# Patient Record
Sex: FEMALE | Race: WHITE | URBAN - METROPOLITAN AREA
[De-identification: names, ages, dates, MRNs, and addresses within clinical notes are randomized per-mention and may not be internally consistent; named-entity substitution may affect disease eponyms.]

---

## 2017-08-17 ENCOUNTER — HOSPITAL ENCOUNTER (OUTPATIENT)
Dept: PHYSICAL THERAPY | Age: 32
Discharge: HOME | End: 2017-08-17
Payer: COMMERCIAL

## 2017-08-17 PROCEDURE — 97110 THERAPEUTIC EXERCISES: CPT

## 2017-08-17 PROCEDURE — 97161 PT EVAL LOW COMPLEX 20 MIN: CPT

## 2017-08-17 NOTE — PROGRESS NOTES
PT DAILY TREATMENT NOTE     Patient Name: Myrna Malhotra  Date:2017  : 1985  [x]  Patient  Verified  Payor: Longtariq Rogershakeem / Plan: Argelia Quiros [id number] PPO / Product Type: PPO /    In time:800  Out time:838  Total Treatment Time (min): 38  Visit #: 1 of 8    Treatment Area: Neck pain [M54.2]  Neck sprain [S13. 9XXA]  Lumbar sprain [S33. 5XXA]    SUBJECTIVE  Pain Level (0-10 scale): 4-5  Any medication changes, allergies to medications, adverse drug reactions, diagnosis change, or new procedure performed?: [x] No    [] Yes (see summary sheet for update)  Subjective functional status/changes:   [] No changes reported      OBJECTIVE    Modality rationale:    Min Type Additional Details    [] Estim:  []Unatt       []IFC  []Premod                        []Other:  []w/ice   []w/heat  Position:  Location:    [] Estim: []Att    []TENS instruct  []NMES                    []Other:  []w/US   []w/ice   []w/heat  Position:  Location:    []  Traction: [] Cervical       []Lumbar                       [] Prone          []Supine                       []Intermittent   []Continuous Lbs:  [] before manual  [] after manual    []  Ultrasound: []Continuous   [] Pulsed                           []1MHz   []3MHz W/cm2:  Location:    []  Iontophoresis with dexamethasone         Location: [] Take home patch   [] In clinic    []  Ice     []  heat  []  Ice massage  []  Laser   []  Anodyne Position:  Location:    []  Laser with stim  []  Other:  Position:  Location:    []  Vasopneumatic Device Pressure:       [] lo [] med [] hi   Temperature: [] lo [] med [] hi   [] Skin assessment post-treatment:  []intact []redness- no adverse reaction    []redness  adverse reaction:     30 min [x]Eval                  []Re-Eval       8 min Therapeutic Exercise:  [] See flow sheet :HEP   Rationale: increase ROM and increase strength to improve the patients ability to perform daily tasks       With   [] TE   [] TA   [] neuro   [] other: Patient Education: [x] Review HEP    [] Progressed/Changed HEP based on:   [] positioning   [] body mechanics   [] transfers   [] heat/ice application    [] other:      Other Objective/Functional Measures:      Pain Level (0-10 scale) post treatment: 4-5    ASSESSMENT/Changes in Function:     Patient will continue to benefit from skilled PT services to modify and progress therapeutic interventions, address functional mobility deficits, address ROM deficits, address strength deficits, analyze and address soft tissue restrictions, analyze and cue movement patterns, analyze and modify body mechanics/ergonomics, assess and modify postural abnormalities, address imbalance/dizziness and instruct in home and community integration to attain remaining goals.      [x]  See Plan of Care  []  See progress note/recertification  []  See Discharge Summary         Progress towards goals / Updated goals:  See POC    PLAN  []  Upgrade activities as tolerated     [x]  Continue plan of care  []  Update interventions per flow sheet       []  Discharge due to:_  []  Other:_      David Hernandez PT 8/17/2017  7:34 AM    Future Appointments  Date Time Provider Pako Moya   8/17/2017 8:00 AM David Hernandez PT [facility]
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
## 2017-08-22 ENCOUNTER — HOSPITAL ENCOUNTER (OUTPATIENT)
Dept: PHYSICAL THERAPY | Age: 32
Discharge: HOME | End: 2017-08-22
Payer: COMMERCIAL

## 2017-08-22 PROCEDURE — 97112 NEUROMUSCULAR REEDUCATION: CPT

## 2017-08-22 PROCEDURE — 97140 MANUAL THERAPY 1/> REGIONS: CPT

## 2017-08-22 NOTE — PROGRESS NOTES
PT DAILY TREATMENT NOTE     Patient Name: Álvaro Alas  Date:2017  : 1985  [x]  Patient  Verified  Payor: BLUE CROSS / Plan: Argelia Quiros [id number] PPO / Product Type: PPO /    In time:727  Out time:810  Total Treatment Time (min): 43  Visit #: 2 of 8    Treatment Area: Neck pain [M54.2]  Neck sprain [S13. 9XXA]  Lumbar sprain [S33. 5XXA]    SUBJECTIVE  Pain Level (0-10 scale): 4  Any medication changes, allergies to medications, adverse drug reactions, diagnosis change, or new procedure performed?: [x] No    [] Yes (see summary sheet for update)  Subjective functional status/changes:   [] No changes reported  It's still the same tightness. I had to help a resident yesterday and that tweaked my back.      OBJECTIVE    Modality rationale: decrease pain and increase tissue extensibility to improve the patients ability to improve activity tolerance   Min Type Additional Details    [] Estim:  []Unatt       []IFC  []Premod                        []Other:  []w/ice   []w/heat  Position:  Location:    [] Estim: []Att    []TENS instruct  []NMES                    []Other:  []w/US   []w/ice   []w/heat  Position:  Location:    []  Traction: [] Cervical       []Lumbar                       [] Prone          []Supine                       []Intermittent   []Continuous Lbs:  [] before manual  [] after manual    []  Ultrasound: []Continuous   [] Pulsed                           []1MHz   []3MHz W/cm2:  Location:    []  Iontophoresis with dexamethasone         Location: [] Take home patch   [] In clinic   10 []  Ice     [x]  heat  []  Ice massage  []  Laser   []  Anodyne Position:seated  Location:neck    []  Laser with stim  []  Other:  Position:  Location:    []  Vasopneumatic Device Pressure:       [] lo [] med [] hi   Temperature: [] lo [] med [] hi   [] Skin assessment post-treatment:  []intact []redness- no adverse reaction    []redness  adverse reaction:     23 min Neuromuscular Re-education:  [x] See flow sheet :   Rationale: increase strength and improve coordination  to improve the patients ability to improve postural awareness and stability     10 min Manual Therapy:  STM bilateral rhomboids   Rationale: decrease pain, increase ROM, increase tissue extensibility and decrease trigger points to reduce headaches      With   [] TE   [] TA   [] neuro   [] other: Patient Education: [x] Review HEP    [] Progressed/Changed HEP based on:   [] positioning   [] body mechanics   [] transfers   [] heat/ice application    [] other:      Other Objective/Functional Measures: initiated treatment per flow sheet     Pain Level (0-10 scale) post treatment: 5    ASSESSMENT/Changes in Function: Patient demonstrates good technique with exercises but does report increased soreness afterward; reports that at home she has been avoiding aggravating factors so today her muscles were not as used to working out. States modalities helped. Patient will continue to benefit from skilled PT services to modify and progress therapeutic interventions, address functional mobility deficits, address ROM deficits, address strength deficits, analyze and address soft tissue restrictions, analyze and cue movement patterns, analyze and modify body mechanics/ergonomics, assess and modify postural abnormalities, address imbalance/dizziness and instruct in home and community integration to attain remaining goals. []  See Plan of Care  []  See progress note/recertification  []  See Discharge Summary         Progress towards goals / Updated goals:  Short Term Goals: To be accomplished in 1 weeks:  Goal: Patient will be independent and compliant with HEP in order to progress toward long term goals. Status at last note/certification: Issued and reviewed  Current status: Met  Long Term Goals: To be accomplished in 4 weeks:  Goal: Patient will improve FOTO assessment score to 72 in order to indicate improved functional abilities.   Status at last note/certification: 52  Goal: Patient will improve cervical AROM by at least 5 degrees all planes, without increase in pain, in order to improve ease of driving. Status at last note/certification: flexion 37, extension 35, rotation Right 60 Left 50, lateral flexion Right 25 Left 20 all with pain  Goal: Patient will report worst neck pain as 4/10 or less in order to improve ease of caring for 3year old daughter. Status at last note/certification: 6/75  Goal: Patient will report no more than 1 headache every 2 weeks in order to improve overall quality of life.   Status at last note/certification: 1-2 per week    PLAN  []  Upgrade activities as tolerated     [x]  Continue plan of care  []  Update interventions per flow sheet       []  Discharge due to:_  []  Other:_      Chantal Jara, PT 8/22/2017  7:10 AM    Future Appointments  Date Time Provider Pako Moya   8/22/2017 7:30 AM Chantal Jara, PT [facility]   8/25/2017 7:30 AM Giorgio Gibbons [facility]   8/29/2017 7:30 AM Chantal Jara, PT [facility]   9/1/2017 7:30 AM Chantal Jara, PT [facility]   9/6/2017 7:30 AM Chantal Jara, PT [facility]   9/8/2017 7:30 AM Chantal Jara, PT [facility]

## 2017-08-25 ENCOUNTER — HOSPITAL ENCOUNTER (OUTPATIENT)
Dept: PHYSICAL THERAPY | Age: 32
Discharge: HOME | End: 2017-08-25
Payer: COMMERCIAL

## 2017-08-25 PROCEDURE — 97140 MANUAL THERAPY 1/> REGIONS: CPT

## 2017-08-25 PROCEDURE — 97112 NEUROMUSCULAR REEDUCATION: CPT

## 2017-08-25 NOTE — PROGRESS NOTES
PT DAILY TREATMENT NOTE     Patient Name: Tom Mai  Date:2017  : 1985  [x]  Patient  Verified  Payor: BLUE CROSS / Plan: Argelia Quiros [id number] PPO / Product Type: PPO /    In time:1158 Out time:1235  Total Treatment Time (min): 37  Visit #: 3 of 8    Treatment Area: Neck pain [M54.2]  Neck sprain [S13. 9XXA]  Lumbar sprain [S33. 5XXA]    SUBJECTIVE  Pain Level (0-10 scale): 4  Any medication changes, allergies to medications, adverse drug reactions, diagnosis change, or new procedure performed?: [x] No    [] Yes (see summary sheet for update)  Subjective functional status/changes:   [] No changes reported  I was very stiff and sore after last time.      OBJECTIVE    Modality rationale: Patient declined   Min Type Additional Details    [] Estim:  []Unatt       []IFC  []Premod                        []Other:  []w/ice   []w/heat  Position:  Location:    [] Estim: []Att    []TENS instruct  []NMES                    []Other:  []w/US   []w/ice   []w/heat  Position:  Location:    []  Traction: [] Cervical       []Lumbar                       [] Prone          []Supine                       []Intermittent   []Continuous Lbs:  [] before manual  [] after manual    []  Ultrasound: []Continuous   [] Pulsed                           []1MHz   []3MHz W/cm2:  Location:    []  Iontophoresis with dexamethasone         Location: [] Take home patch   [] In clinic    []  Ice     []  heat  []  Ice massage  []  Laser   []  Anodyne Position:  Location:    []  Laser with stim  []  Other:  Position:  Location:    []  Vasopneumatic Device Pressure:       [] lo [] med [] hi   Temperature: [] lo [] med [] hi   [] Skin assessment post-treatment:  []intact []redness- no adverse reaction    []redness  adverse reaction:     27 min Neuromuscular Re-education:  [x]  See flow sheet :   Rationale: increase strength and improve coordination  to improve the patients ability to improve postural awareness, upper body stability     10 min Manual Therapy:  STM Bilateral Rhomboids in prone   Rationale: decrease pain, increase ROM, increase tissue extensibility and decrease trigger points to improve ease of job tasks      With   [] TE   [] TA   [] neuro   [] other: Patient Education: [x] Review HEP    [] Progressed/Changed HEP based on:   [] positioning   [] body mechanics   [] transfers   [] heat/ice application    [] other:      Other Objective/Functional Measures: added Chair HS I, chin tuck and lift     Pain Level (0-10 scale) post treatment: 5    ASSESSMENT/Changes in Function: Patient reports feeling more sore and stiff after last session, but as she predicted with beginning to work her muscles again. Did feel comfortable going through routine again today, adding two additional exercises. Slight improvement in some cervical AROM noted. Patient again reports feeling \"worked\" after session but not necessarily in a sharp painful way. Patient will continue to benefit from skilled PT services to modify and progress therapeutic interventions, address functional mobility deficits, address ROM deficits, address strength deficits, analyze and address soft tissue restrictions, analyze and cue movement patterns, analyze and modify body mechanics/ergonomics, assess and modify postural abnormalities and instruct in home and community integration to attain remaining goals. []  See Plan of Care  []  See progress note/recertification  []  See Discharge Summary         Progress towards goals / Updated goals:  Short Term Goals: To be accomplished in 1 weeks:  Goal: Patient will be independent and compliant with HEP in order to progress toward long term goals. Status at last note/certification: Issued and reviewed  Current status: Met  Long Term Goals: To be accomplished in 4 weeks:  Goal: Patient will improve FOTO assessment score to 72 in order to indicate improved functional abilities.   Status at last note/certification: 52  Current status: Assess next visit  Goal: Patient will improve cervical AROM by at least 5 degrees all planes, without increase in pain, in order to improve ease of driving. Status at last note/certification: flexion 37, extension 35, rotation Right 60 Left 50, lateral flexion Right 25 Left 20 all with pain  Current status: Progressing, flexion 55, extension 35, Rotation Right 50 Left 55, lateral flexion Right 27 Left 25  Goal: Patient will report worst neck pain as 4/10 or less in order to improve ease of caring for 3year old daughter. Status at last note/certification: 7/98  Current status: Progressing, 5-6/10  Goal: Patient will report no more than 1 headache every 2 weeks in order to improve overall quality of life.   Status at last note/certification: 1-2 per week  Current status: Progressing, headache earlier this week    PLAN  [x]  Upgrade activities as tolerated     [x]  Continue plan of care  []  Update interventions per flow sheet       []  Discharge due to:_  []  Other:_      Berto Lewis PT 8/25/2017  11:13 AM    Future Appointments  Date Time Provider Pako Moya   8/25/2017 12:00 PM Berto Lewis PT [facility]   8/29/2017 7:30 AM Berto Lewis PT [facility]   9/1/2017 7:30 AM Berto Lewis PT [facility]   9/6/2017 7:30 AM Berto Lewis PT [facility]   9/8/2017 7:30 AM Berto Lewis, JACKIE Roque

## 2017-08-29 ENCOUNTER — HOSPITAL ENCOUNTER (OUTPATIENT)
Dept: PHYSICAL THERAPY | Age: 32
Discharge: HOME | End: 2017-08-29
Payer: COMMERCIAL

## 2017-08-29 PROCEDURE — 97112 NEUROMUSCULAR REEDUCATION: CPT

## 2017-08-29 PROCEDURE — 97140 MANUAL THERAPY 1/> REGIONS: CPT

## 2017-08-29 NOTE — PROGRESS NOTES
PT DAILY TREATMENT NOTE     Patient Name: Carol Home  Date:2017  : 1985  [x]  Patient  Verified  Payor: BLUE CROSS / Plan: Argelia Quiros [id number] PPO / Product Type: PPO /    In time:743  Out time:820  Total Treatment Time (min): 37  Visit #: 4 of 8    Treatment Area: Neck pain [M54.2]  Neck sprain [S13. 9XXA]  Lumbar sprain [S33. 5XXA]    SUBJECTIVE  Pain Level (0-10 scale): 0  Any medication changes, allergies to medications, adverse drug reactions, diagnosis change, or new procedure performed?: [x] No    [] Yes (see summary sheet for update)  Subjective functional status/changes:   [] No changes reported  Before I went to bed I did some stretching and laid on the heating pad. So it felt good this morning.      OBJECTIVE    Modality rationale: Patient declined   Min Type Additional Details    [] Estim:  []Unatt       []IFC  []Premod                        []Other:  []w/ice   []w/heat  Position:  Location:    [] Estim: []Att    []TENS instruct  []NMES                    []Other:  []w/US   []w/ice   []w/heat  Position:  Location:    []  Traction: [] Cervical       []Lumbar                       [] Prone          []Supine                       []Intermittent   []Continuous Lbs:  [] before manual  [] after manual    []  Ultrasound: []Continuous   [] Pulsed                           []1MHz   []3MHz W/cm2:  Location:    []  Iontophoresis with dexamethasone         Location: [] Take home patch   [] In clinic    []  Ice     []  heat  []  Ice massage  []  Laser   []  Anodyne Position:  Location:    []  Laser with stim  []  Other:  Position:  Location:    []  Vasopneumatic Device Pressure:       [] lo [] med [] hi   Temperature: [] lo [] med [] hi   [] Skin assessment post-treatment:  []intact []redness- no adverse reaction    []redness  adverse reaction:     27 min Neuromuscular Re-education:  [x]  See flow sheet :   Rationale: increase strength and improve coordination  to improve the patients ability to improve neck/shoulder stability with daily tasks    10 min Manual Therapy:  STM b rhomboids   Rationale: decrease pain, increase ROM, increase tissue extensibility and decrease trigger points to improve ease of caring for young daughter          With   [] TE   [] TA   [] neuro   [] other: Patient Education: [x] Review HEP    [] Progressed/Changed HEP based on:   [] positioning   [] body mechanics   [] transfers   [] heat/ice application    [] other:      Other Objective/Functional Measures: increased repetitions     Pain Level (0-10 scale) post treatment: 2    ASSESSMENT/Changes in Function: Patient reports faster recovery after last session regarding stiffness/soreness. Able to progress routine today. Reports only discomfort is between her shoulder blades after session. Patient will continue to benefit from skilled PT services to modify and progress therapeutic interventions, address functional mobility deficits, address ROM deficits, address strength deficits, analyze and address soft tissue restrictions, analyze and cue movement patterns, analyze and modify body mechanics/ergonomics, assess and modify postural abnormalities and instruct in home and community integration to attain remaining goals. []  See Plan of Care  []  See progress note/recertification  []  See Discharge Summary         Progress towards goals / Updated goals:  Short Term Goals: To be accomplished in 1 weeks:  Goal: Patient will be independent and compliant with HEP in order to progress toward long term goals. Status at last note/certification: Issued and reviewed  Current status: Met  Long Term Goals: To be accomplished in 4 weeks:  Goal: Patient will improve FOTO assessment score to 72 in order to indicate improved functional abilities.   Status at last note/certification: 52  Current status: Assess next visit  Goal: Patient will improve cervical AROM by at least 5 degrees all planes, without increase in pain, in order to improve ease of driving. Status at last note/certification: flexion 37, extension 35, rotation Right 60 Left 50, lateral flexion Right 25 Left 20 all with pain  Current status: Progressing, flexion 55, extension 35, Rotation Right 50 Left 55, lateral flexion Right 27 Left 25  Goal: Patient will report worst neck pain as 4/10 or less in order to improve ease of caring for 3year old daughter. Status at last note/certification: 1/60  Current status: Progressing, 5-6/10  Goal: Patient will report no more than 1 headache every 2 weeks in order to improve overall quality of life.   Status at last note/certification: 1-2 per week  Current status: Progressing, headache earlier this week    PLAN  [x]  Upgrade activities as tolerated     [x]  Continue plan of care  []  Update interventions per flow sheet       []  Discharge due to:_  []  Other:_      Miguel Lyon PT 8/29/2017  7:13 AM    Future Appointments  Date Time Provider Pako Moya   8/29/2017 7:30 AM Miguel Lyon [facility]   9/1/2017 7:30 AM Miguel Lyon PT [facility]   9/6/2017 7:30 AM Miguel Lyon PT [facility]   9/8/2017 7:30 AM Miguel Lyon, PT Sherryle Connor

## 2017-09-01 ENCOUNTER — HOSPITAL ENCOUNTER (OUTPATIENT)
Dept: PHYSICAL THERAPY | Age: 32
Discharge: HOME | End: 2017-09-01
Payer: COMMERCIAL

## 2017-09-01 PROCEDURE — 97140 MANUAL THERAPY 1/> REGIONS: CPT

## 2017-09-01 PROCEDURE — 97112 NEUROMUSCULAR REEDUCATION: CPT

## 2017-09-01 NOTE — PROGRESS NOTES
PT DAILY TREATMENT NOTE     Patient Name: Tyrese Mcpherson  Date:2017  : 1985  [x]  Patient  Verified  Payor: BLUE CROSS / Plan: Argelia Quiros [id number] PPO / Product Type: PPO /    In time:730  Out time:802  Total Treatment Time (min): 32  Visit #: 5 of 8    Treatment Area: Neck pain [M54.2]  Neck sprain [S13. 9XXA]  Lumbar sprain [S33. 5XXA]    SUBJECTIVE  Pain Level (0-10 scale): 3.5  Any medication changes, allergies to medications, adverse drug reactions, diagnosis change, or new procedure performed?: [x] No    [] Yes (see summary sheet for update)  Subjective functional status/changes:   [] No changes reported  I think I did too much.     OBJECTIVE    Modality rationale: Patient declined   Min Type Additional Details    [] Estim:  []Unatt       []IFC  []Premod                        []Other:  []w/ice   []w/heat  Position:  Location:    [] Estim: []Att    []TENS instruct  []NMES                    []Other:  []w/US   []w/ice   []w/heat  Position:  Location:    []  Traction: [] Cervical       []Lumbar                       [] Prone          []Supine                       []Intermittent   []Continuous Lbs:  [] before manual  [] after manual    []  Ultrasound: []Continuous   [] Pulsed                           []1MHz   []3MHz W/cm2:  Location:    []  Iontophoresis with dexamethasone         Location: [] Take home patch   [] In clinic    []  Ice     []  heat  []  Ice massage  []  Laser   []  Anodyne Position:  Location:    []  Laser with stim  []  Other:  Position:  Location:    []  Vasopneumatic Device Pressure:       [] lo [] med [] hi   Temperature: [] lo [] med [] hi   [] Skin assessment post-treatment:  []intact []redness- no adverse reaction    []redness  adverse reaction:     22 min Neuromuscular Re-education:  [x]  See flow sheet :   Rationale: increase strength, improve coordination and increase proprioception  to improve the patients ability to improve upper body stability to reduce neck tension    10 min Manual Therapy:  STM B cervical paraspinals/UT/scalenes   Rationale: decrease pain, increase ROM, increase tissue extensibility and decrease trigger points to improve ease of job tasks, reduce headaches      With   [] TE   [] TA   [] neuro   [] other: Patient Education: [x] Review HEP    [] Progressed/Changed HEP based on:   [] positioning   [] body mechanics   [] transfers   [] heat/ice application    [] other:      Other Objective/Functional Measures: completed exercises per flow sheet     Pain Level (0-10 scale) post treatment: 4    ASSESSMENT/Changes in Function: Focused manual therapy on neck today due to lingering ROM deficits and complaints of neck pain when lifting her daughter. Patient will continue to benefit from skilled PT services to modify and progress therapeutic interventions, address functional mobility deficits, address ROM deficits, address strength deficits, analyze and address soft tissue restrictions, analyze and cue movement patterns, analyze and modify body mechanics/ergonomics, assess and modify postural abnormalities and instruct in home and community integration to attain remaining goals. []  See Plan of Care  []  See progress note/recertification  []  See Discharge Summary         Progress towards goals / Updated goals:  Short Term Goals: To be accomplished in 1 weeks:  Goal: Patient will be independent and compliant with HEP in order to progress toward long term goals. Status at last note/certification: Issued and reviewed  Current status: Met  Long Term Goals: To be accomplished in 4 weeks:  Goal: Patient will improve FOTO assessment score to 72 in order to indicate improved functional abilities. Status at last note/certification: 52  Current status: Progressing, 59  Goal: Patient will improve cervical AROM by at least 5 degrees all planes, without increase in pain, in order to improve ease of driving.   Status at last note/certification: flexion 37, extension 35, rotation Right 60 Left 50, lateral flexion Right 25 Left 20 all with pain  Current status: Progressing, flexion 55, extension 35, Rotation Right 45 Left 55, lateral flexion Right 21 Left 25  Goal: Patient will report worst neck pain as 4/10 or less in order to improve ease of caring for 3year old daughter. Status at last note/certification: 8/20  Current status: Progressing, 6/10  Goal: Patient will report no more than 1 headache every 2 weeks in order to improve overall quality of life.   Status at last note/certification: 1-2 per week  Current status: Met    PLAN  [x]  Upgrade activities as tolerated     [x]  Continue plan of care  []  Update interventions per flow sheet       []  Discharge due to:_  []  Other:_      Niraj Cabrales, PT 9/1/2017  7:13 AM    Future Appointments  Date Time Provider Pako Moya   9/1/2017 7:30 AM Niraj Cabrales, PT [facility]   9/6/2017 7:30 AM Niraj Cabrales, PT [facility]   9/8/2017 7:30 AM Niraj Cabrales, PT Kika Tyson

## 2017-09-06 ENCOUNTER — HOSPITAL ENCOUNTER (OUTPATIENT)
Dept: PHYSICAL THERAPY | Age: 32
Discharge: HOME | End: 2017-09-06
Payer: COMMERCIAL

## 2017-09-06 PROCEDURE — 97112 NEUROMUSCULAR REEDUCATION: CPT

## 2017-09-06 PROCEDURE — 97140 MANUAL THERAPY 1/> REGIONS: CPT

## 2017-09-06 NOTE — PROGRESS NOTES
PT DAILY TREATMENT NOTE     Patient Name: Kati Rodrigez  Date:2017  : 1985  [x]  Patient  Verified  Payor: BLUE CROSS / Plan: Argelia Quiros [id number] PPO / Product Type: PPO /    In time:740  Out time:812  Total Treatment Time (min): 32  Visit #: 6 of 8    Treatment Area: Neck pain [M54.2]  Neck sprain [S13. 9XXA]  Lumbar sprain [S33. 5XXA]    SUBJECTIVE  Pain Level (0-10 scale): 4  Any medication changes, allergies to medications, adverse drug reactions, diagnosis change, or new procedure performed?: [x] No    [] Yes (see summary sheet for update)  Subjective functional status/changes:   [] No changes reported  On  we were out and about and it got so bad in my neck I had to stop.      OBJECTIVE    Modality rationale:    Min Type Additional Details    [] Estim:  []Unatt       []IFC  []Premod                        []Other:  []w/ice   []w/heat  Position:  Location:    [] Estim: []Att    []TENS instruct  []NMES                    []Other:  []w/US   []w/ice   []w/heat  Position:  Location:    []  Traction: [] Cervical       []Lumbar                       [] Prone          []Supine                       []Intermittent   []Continuous Lbs:  [] before manual  [] after manual    []  Ultrasound: []Continuous   [] Pulsed                           []1MHz   []3MHz W/cm2:  Location:    []  Iontophoresis with dexamethasone         Location: [] Take home patch   [] In clinic    []  Ice     []  heat  []  Ice massage  []  Laser   []  Anodyne Position:  Location:    []  Laser with stim  []  Other:  Position:  Location:    []  Vasopneumatic Device Pressure:       [] lo [] med [] hi   Temperature: [] lo [] med [] hi   [] Skin assessment post-treatment:  []intact []redness- no adverse reaction    []redness  adverse reaction:     22 min Neuromuscular Re-education:  [x]  See flow sheet :   Rationale: increase strength, improve coordination and increase proprioception  to improve the patients ability to improve cervical and scapular stability during daily tasks    10 min Manual Therapy:  STM bilateral cervical paraspinals, UT/scalenes, gentle SOR/cervical manual distraction   Rationale: decrease pain, increase ROM, increase tissue extensibility and decrease trigger points to reduce headaches, improve ease of lifting daughter          With   [] TE   [] TA   [] neuro   [] other: Patient Education: [x] Review HEP    [] Progressed/Changed HEP based on:   [] positioning   [] body mechanics   [] transfers   [] heat/ice application    [] other:      Other Objective/Functional Measures: increased resistance per flow sheet     Pain Level (0-10 scale) post treatment: 2-3    ASSESSMENT/Changes in Function: Patient reports insidious increase in neck pain this weekend when out with her family. MD appointment yesterday went well with MD suggesting a few more weeks of therapy to continue to improve cervical AROM, especially Rotation Right. Patient will continue to benefit from skilled PT services to modify and progress therapeutic interventions, address functional mobility deficits, address ROM deficits, address strength deficits, analyze and address soft tissue restrictions, analyze and cue movement patterns, analyze and modify body mechanics/ergonomics, assess and modify postural abnormalities and instruct in home and community integration to attain remaining goals. []  See Plan of Care  []  See progress note/recertification  []  See Discharge Summary         Progress towards goals / Updated goals:  Short Term Goals: To be accomplished in 1 weeks:  Goal: Patient will be independent and compliant with HEP in order to progress toward long term goals. Status at last note/certification: Issued and reviewed  Current status: Met  Long Term Goals: To be accomplished in 4 weeks:  Goal: Patient will improve FOTO assessment score to 72 in order to indicate improved functional abilities.   Status at last note/certification: 52  Current status: Progressing, 59  Goal: Patient will improve cervical AROM by at least 5 degrees all planes, without increase in pain, in order to improve ease of driving. Status at last note/certification: flexion 37, extension 35, rotation Right 60 Left 50, lateral flexion Right 25 Left 20 all with pain  Current status: Progressing, flexion 55, extension 35, Rotation Right 45 Left 55, lateral flexion Right 21 Left 25  Goal: Patient will report worst neck pain as 4/10 or less in order to improve ease of caring for 3year old daughter. Status at last note/certification: 1/23  Current status: Progressing, 6/10  Goal: Patient will report no more than 1 headache every 2 weeks in order to improve overall quality of life.   Status at last note/certification: 1-2 per week  Current status: Met    PLAN  [x]  Upgrade activities as tolerated     [x]  Continue plan of care  []  Update interventions per flow sheet       []  Discharge due to:_  []  Other:_      Baudilio Keys PT 9/6/2017  7:14 AM    Future Appointments  Date Time Provider Pako Moya   9/6/2017 7:30 AM Baudilio Keys PT [facility] [address]   9/8/2017 7:30 AM Baudilio Keys PT [facility] [address]

## 2017-09-08 ENCOUNTER — HOSPITAL ENCOUNTER (OUTPATIENT)
Dept: PHYSICAL THERAPY | Age: 32
Discharge: HOME | End: 2017-09-08
Payer: COMMERCIAL

## 2017-09-08 PROCEDURE — 97140 MANUAL THERAPY 1/> REGIONS: CPT

## 2017-09-08 PROCEDURE — 97112 NEUROMUSCULAR REEDUCATION: CPT

## 2017-09-08 NOTE — PROGRESS NOTES
[facility], [address] [place] [address]  [phone] fax    Progress Note  Patient name: Surjit Richey Start of Care: 2017   Referral source: Krystle Parrish MD : 1985                          Medical Diagnosis: Neck pain [M54.2]  Neck sprain [S13. 9XXA]  Lumbar sprain [S33. 5XXA] Onset Date:17                          Treatment Diagnosis: neck, back pain   Prior Hospitalization: see medical history Provider#: [id number]   Medications: Verified on Patient summary List    Comorbidities: none   Prior Level of Function: Functionally independent, lives with family, works in property management    Visits from [facility] of Care: 7    Missed Visits: 0    Established Goals:          Excellent Good         Limited           None  [x] Increased ROM   []  [x]  []  []  [] Increased Strength  []  []  []  []  [x] Increased Mobility  []  [x]  []  []   [x] Decreased Pain   []  [x]  []  []  [] Decreased Swelling  []  []  []  []  Short Term Goals: To be accomplished in 1 weeks:  Goal: Patient will be independent and compliant with HEP in order to progress toward long term goals. Status at last note/certification: Issued and reviewed  Current status: Met  Long Term Goals: To be accomplished in 4 weeks:  Goal: Patient will improve FOTO assessment score to 72 in order to indicate improved functional abilities. Status at last note/certification: 52  Current status: Progressing, 59  Goal: Patient will improve cervical AROM by at least 5 degrees all planes, without increase in pain, in order to improve ease of driving.   Status at last note/certification: flexion 37, extension 35, rotation Right 60 Left 50, lateral flexion Right 25 Left 20 all with pain  Current status: Progressing, flexion 55, extension 45, Rotation Right 55 Left 55, lateral flexion Right 21 Left 25  Goal: Patient will report worst neck pain as 4/10 or less in order to improve ease of caring for 3year old daughter. Status at last note/certification: 2/46  Current status: Progressing, remains 6/10  Goal: Patient will report no more than 1 headache every 2 weeks in order to improve overall quality of life. Status at last note/certification: 1-2 per week  Current status: Met      Key Functional Changes:   Functional Gains: cervical AROM in most planes, fewer headaches, less frequent severe pain  Functional Deficits: limitations in cervical Right rotation and lateral flexion, some periods of elevated pain with too much activity/lifting daughter  % improvement: 40-50%  Pain   Average: 2-3/10       Best: 0-1/10     Worst: 6/10  Patient Goal: \"Back to what it was before the accident\"    Updated Goals: to be achieved in 4 weeks:  Goal: Patient will improve FOTO assessment score to 72 in order to indicate improved functional abilities. Status at last note/certification: 59  Goal: Patient will improve cervical Right rotation and lateral flexion AROM by at least 10 degrees, without increase in pain, in order to improve ease of driving. Status at last note/certification: Rotation 55, lateral flexion 21  Goal: Patient will report worst neck pain as 4/10 or less in order to improve ease of caring for 3year old daughter.   Status at last note/certification: 1/32    ASSESSMENT/RECOMMENDATIONS:  [x]Continue therapy per initial plan/protocol at a frequency of  2 x per week for 4 weeks  []Continue therapy with the following recommended changes:_____________________      _____________________________________________________________________  []Discontinue therapy progressing towards or have reached established goals  []Discontinue therapy due to lack of appreciable progress towards goals  []Discontinue therapy due to lack of attendance or compliance  []Await Physician's recommendations/decisions regarding therapy  []Other:________________________________________________________________    Thank you for this referral.   Mary Smith, PT 9/8/2017 7:15 AM  NOTE TO PHYSICIAN:  PLEASE COMPLETE THE ORDERS BELOW AND   FAX TO [facility]: [phone]  If you are unable to process this request in 24 hours please contact our office: [phone]  []  I have read the above report and request that my patient continue as recommended. []  I have read the above report and request that my patient continue therapy with the following changes/special instructions:________________________________________  []I have read the above report and request that my patient be discharged from therapy.     Physicians signature: ______________________________Date: ______Time:______

## 2017-09-08 NOTE — PROGRESS NOTES
PT DAILY TREATMENT NOTE     Patient Name: Timmy Ray  Date:2017  : 1985  [x]  Patient  Verified  Payor: BLUE CROSS / Plan: Argelia Quiros [id number] PPO / Product Type: PPO /    In time:735  Out time:805  Total Treatment Time (min): 30  Visit #: 7 of 8    Treatment Area: Neck pain [M54.2]  Neck sprain [S13. 9XXA]  Lumbar sprain [S33. 5XXA]    SUBJECTIVE  Pain Level (0-10 scale): 1-2  Any medication changes, allergies to medications, adverse drug reactions, diagnosis change, or new procedure performed?: [x] No    [] Yes (see summary sheet for update)  Subjective functional status/changes:   [] No changes reported  It's the same.     OBJECTIVE    Modality rationale:    Min Type Additional Details    [] Estim:  []Unatt       []IFC  []Premod                        []Other:  []w/ice   []w/heat  Position:  Location:    [] Estim: []Att    []TENS instruct  []NMES                    []Other:  []w/US   []w/ice   []w/heat  Position:  Location:    []  Traction: [] Cervical       []Lumbar                       [] Prone          []Supine                       []Intermittent   []Continuous Lbs:  [] before manual  [] after manual    []  Ultrasound: []Continuous   [] Pulsed                           []1MHz   []3MHz W/cm2:  Location:    []  Iontophoresis with dexamethasone         Location: [] Take home patch   [] In clinic    []  Ice     []  heat  []  Ice massage  []  Laser   []  Anodyne Position:  Location:    []  Laser with stim  []  Other:  Position:  Location:    []  Vasopneumatic Device Pressure:       [] lo [] med [] hi   Temperature: [] lo [] med [] hi   [] Skin assessment post-treatment:  []intact []redness- no adverse reaction    []redness  adverse reaction:     20 min Neuromuscular Re-education:  [x]  See flow sheet :   Rationale: increase strength and improve coordination  to improve the patients ability to improve ease of job tasks    10 min Manual Therapy:  STM B cervical paraspinals, scalenes, UT, manual cervical stretching   Rationale: decrease pain, increase ROM, increase tissue extensibility and decrease trigger points to reduce headaches    With   [] TE   [] TA   [] neuro   [] other: Patient Education: [x] Review HEP    [] Progressed/Changed HEP based on:   [] positioning   [] body mechanics   [] transfers   [] heat/ice application    [] other:      Other Objective/Functional Measures: see goals     Pain Level (0-10 scale) post treatment: 1-2    ASSESSMENT/Changes in Function: Progressing well with decreased pain. Some improvements in Right cervical rotation AROM compared to last week but overall less than evaluation. Will progress routine as able. Patient will continue to benefit from skilled PT services to modify and progress therapeutic interventions, address functional mobility deficits, address ROM deficits, address strength deficits, analyze and address soft tissue restrictions, analyze and cue movement patterns, analyze and modify body mechanics/ergonomics, assess and modify postural abnormalities and instruct in home and community integration to attain remaining goals. []  See Plan of Care  [x]  See progress note/recertification  []  See Discharge Summary         Progress towards goals / Updated goals:  Short Term Goals: To be accomplished in 1 weeks:  Goal: Patient will be independent and compliant with HEP in order to progress toward long term goals. Status at last note/certification: Issued and reviewed  Current status: Met  Long Term Goals: To be accomplished in 4 weeks:  Goal: Patient will improve FOTO assessment score to 72 in order to indicate improved functional abilities. Status at last note/certification: 52  Current status: Progressing, 59  Goal: Patient will improve cervical AROM by at least 5 degrees all planes, without increase in pain, in order to improve ease of driving.   Status at last note/certification: flexion 37, extension 35, rotation Right 60 Left 50, lateral flexion Right 25 Left 20 all with pain  Current status: Progressing, flexion 55, extension 45, Rotation Right 55 Left 55, lateral flexion Right 21 Left 25  Goal: Patient will report worst neck pain as 4/10 or less in order to improve ease of caring for 3year old daughter. Status at last note/certification: 5/96  Current status: Progressing, remains 6/10  Goal: Patient will report no more than 1 headache every 2 weeks in order to improve overall quality of life.   Status at last note/certification: 1-2 per week  Current status: Met       PLAN  [x]  Upgrade activities as tolerated     [x]  Continue plan of care  []  Update interventions per flow sheet       []  Discharge due to:_  []  Other:_      Lissa Cedeño, PT 9/8/2017  7:14 AM    Future Appointments  Date Time Provider Pako Moya   9/8/2017 7:30 AM Lissa Cedeño, PT [facility]   9/11/2017 7:30 AM Lissa Cedeño, PT [facility]   9/13/2017 7:30 AM Lissa Cedeño, PT [facility]   9/18/2017 7:30 AM Lissa Cedeño, PT [facility]   9/20/2017 7:30 AM Lissa Cedeño, PT [facility]   9/25/2017 7:30 AM Lissa Cedeño, PT [facility]   9/27/2017 7:30 AM Lissa Cedeño, PT Em[facility] Sauceda

## 2017-09-11 ENCOUNTER — HOSPITAL ENCOUNTER (OUTPATIENT)
Dept: PHYSICAL THERAPY | Age: 32
Discharge: HOME | End: 2017-09-11
Payer: COMMERCIAL

## 2017-09-11 PROCEDURE — 97112 NEUROMUSCULAR REEDUCATION: CPT

## 2017-09-11 PROCEDURE — 97140 MANUAL THERAPY 1/> REGIONS: CPT

## 2017-09-11 NOTE — PROGRESS NOTES
PT DAILY TREATMENT NOTE     Patient Name: Álvaro Alas  Date:2017  : 1985  [x]  Patient  Verified  Payor: BLUE CROSS / Plan: Argelia Quiros [id number] PPO / Product Type: PPO /    In time:739  Out time:805  Total Treatment Time (min): 26  Visit #: 1 of 8    Treatment Area: Neck pain [M54.2]  Neck sprain [S13. 9XXA]  Lumbar sprain [S33. 5XXA]    SUBJECTIVE  Pain Level (0-10 scale): 3-4  Any medication changes, allergies to medications, adverse drug reactions, diagnosis change, or new procedure performed?: [x] No    [] Yes (see summary sheet for update)  Subjective functional status/changes:   [] No changes reported  We were outside a lot this weekend so I feel it a little more.      OBJECTIVE    Modality rationale: Patient declined   Min Type Additional Details    [] Estim:  []Unatt       []IFC  []Premod                        []Other:  []w/ice   []w/heat  Position:  Location:    [] Estim: []Att    []TENS instruct  []NMES                    []Other:  []w/US   []w/ice   []w/heat  Position:  Location:    []  Traction: [] Cervical       []Lumbar                       [] Prone          []Supine                       []Intermittent   []Continuous Lbs:  [] before manual  [] after manual    []  Ultrasound: []Continuous   [] Pulsed                           []1MHz   []3MHz W/cm2:  Location:    []  Iontophoresis with dexamethasone         Location: [] Take home patch   [] In clinic    []  Ice     []  heat  []  Ice massage  []  Laser   []  Anodyne Position:  Location:    []  Laser with stim  []  Other:  Position:  Location:    []  Vasopneumatic Device Pressure:       [] lo [] med [] hi   Temperature: [] lo [] med [] hi   [] Skin assessment post-treatment:  []intact []redness- no adverse reaction    []redness  adverse reaction:     16 min Neuromuscular Re-education:  [x]  See flow sheet :   Rationale: increase strength and improve coordination  to improve the patients ability to improve scapular, cervical stability    10 min Manual Therapy:  Prone STM bilateral rhomboids/UT/scalenes/cervical paraspinals   Rationale: decrease pain, increase ROM, increase tissue extensibility and decrease trigger points to improve ease of lifting daughter    With   [] TE   [] TA   [] neuro   [] other: Patient Education: [x] Review HEP    [] Progressed/Changed HEP based on:   [] positioning   [] body mechanics   [] transfers   [] heat/ice application    [] other:      Other Objective/Functional Measures: adjusted routine     Pain Level (0-10 scale) post treatment: 3-4    ASSESSMENT/Changes in Function: Progressing scapular stability activities this morning. Patient will continue to benefit from skilled PT services to modify and progress therapeutic interventions, address functional mobility deficits, address ROM deficits, address strength deficits, analyze and address soft tissue restrictions, analyze and cue movement patterns, analyze and modify body mechanics/ergonomics, assess and modify postural abnormalities and instruct in home and community integration to attain remaining goals. []  See Plan of Care  []  See progress note/recertification  []  See Discharge Summary         Progress towards goals / Updated goals:  Goal: Patient will improve FOTO assessment score to 72 in order to indicate improved functional abilities. Status at last note/certification: 59  Goal: Patient will improve cervical Right rotation and lateral flexion AROM by at least 10 degrees, without increase in pain, in order to improve ease of driving. Status at last note/certification: Rotation 55, lateral flexion 21  Goal: Patient will report worst neck pain as 4/10 or less in order to improve ease of caring for 3year old daughter.   Status at last note/certification: 4/34    PLAN  [x]  Upgrade activities as tolerated     [x]  Continue plan of care  []  Update interventions per flow sheet       []  Discharge due to:_  []  Other:_      Juan Carlos Butt Christiane Best PT 9/11/2017  7:09 AM    Future Appointments  Date Time Provider Pako Moya   9/11/2017 7:30 AM Robert Moreno, PT [facility]   9/13/2017 7:30 AM Robert Moreno, PT [facility]   9/18/2017 7:30 AM Robert Moreno, PT [facility]   9/20/2017 7:30 AM Robert Moreno, PT [facility]   9/25/2017 7:30 AM Robert Moreno, PT [facility]   9/27/2017 7:30 AM Robert Moreno, PT Sam[facility] Casillas

## 2017-09-13 ENCOUNTER — HOSPITAL ENCOUNTER (OUTPATIENT)
Dept: PHYSICAL THERAPY | Age: 32
Discharge: HOME | End: 2017-09-13
Payer: COMMERCIAL

## 2017-09-13 PROCEDURE — 97140 MANUAL THERAPY 1/> REGIONS: CPT

## 2017-09-13 PROCEDURE — 97112 NEUROMUSCULAR REEDUCATION: CPT

## 2017-09-13 NOTE — PROGRESS NOTES
PT DAILY TREATMENT NOTE     Patient Name: Buddy Phoenix  Date:2017  : 1985  [x]  Patient  Verified  Payor: Sukhdeep Casitllo / Plan: Argelia Quiros [id number] PPO / Product Type: PPO /    In time:730  Out time:808  Total Treatment Time (min): 38  Visit #: 2 of 8    Treatment Area: Neck pain [M54.2]  Neck sprain [S13. 9XXA]  Lumbar sprain [S33. 5XXA]    SUBJECTIVE  Pain Level (0-10 scale): 2-3  Any medication changes, allergies to medications, adverse drug reactions, diagnosis change, or new procedure performed?: [x] No    [] Yes (see summary sheet for update)  Subjective functional status/changes:   [] No changes reported  Last night I felt like if I turned my head the left side of my neck was going to grab.      OBJECTIVE    Modality rationale:    Min Type Additional Details    [] Estim:  []Unatt       []IFC  []Premod                        []Other:  []w/ice   []w/heat  Position:  Location:    [] Estim: []Att    []TENS instruct  []NMES                    []Other:  []w/US   []w/ice   []w/heat  Position:  Location:    []  Traction: [] Cervical       []Lumbar                       [] Prone          []Supine                       []Intermittent   []Continuous Lbs:  [] before manual  [] after manual    []  Ultrasound: []Continuous   [] Pulsed                           []1MHz   []3MHz W/cm2:  Location:    []  Iontophoresis with dexamethasone         Location: [] Take home patch   [] In clinic    []  Ice     []  heat  []  Ice massage  []  Laser   []  Anodyne Position:  Location:    []  Laser with stim  []  Other:  Position:  Location:    []  Vasopneumatic Device Pressure:       [] lo [] med [] hi   Temperature: [] lo [] med [] hi   [] Skin assessment post-treatment:  []intact []redness- no adverse reaction    []redness  adverse reaction:     26 min Neuromuscular Re-education:  [x]  See flow sheet :   Rationale: increase strength and improve coordination  to improve the patients ability to improve postural awareness during job tasks    12 min Manual Therapy:  STM bilateral cervical paraspinals/UT/scalenes   Rationale: decrease pain, increase ROM, increase tissue extensibility and decrease trigger points to improve ease of all tasks    With   [] TE   [] TA   [] neuro   [] other: Patient Education: [x] Review HEP    [] Progressed/Changed HEP based on:   [] positioning   [] body mechanics   [] transfers   [] heat/ice application    [] other:      Other Objective/Functional Measures: increased repetitions per flow sheet     Pain Level (0-10 scale) post treatment: 2    ASSESSMENT/Changes in Function: Emphasis on Right cervical musculature with manual due to increased pain Right this morning (was more Left last night). Patient will continue to benefit from skilled PT services to modify and progress therapeutic interventions, address functional mobility deficits, address ROM deficits, address strength deficits, analyze and address soft tissue restrictions, analyze and cue movement patterns, analyze and modify body mechanics/ergonomics, assess and modify postural abnormalities and instruct in home and community integration to attain remaining goals. []  See Plan of Care  []  See progress note/recertification  []  See Discharge Summary         Progress towards goals / Updated goals:  Goal: Patient will improve FOTO assessment score to 72 in order to indicate improved functional abilities. Status at last note/certification: 59  Current status: Not met, 58  Goal: Patient will improve cervical Right rotation and lateral flexion AROM by at least 10 degrees, without increase in pain, in order to improve ease of driving. Status at last note/certification: Rotation 55, lateral flexion 21  Current status: Progressing, limited change since last assessment  Goal: Patient will report worst neck pain as 4/10 or less in order to improve ease of caring for 3year old daughter.   Status at last note/certification: 8/51  Current status: Progressing, remains over 4/10 at worst    PLAN  [x]  Upgrade activities as tolerated     [x]  Continue plan of care  []  Update interventions per flow sheet       []  Discharge due to:_  []  Other:_      Niru Frederick, PT 9/13/2017  7:07 AM    Future Appointments  Date Time Provider Pako Moya   9/13/2017 7:30 AM Nelta Factor, PT [facility]   9/18/2017 7:30 AM Nelta Factor, PT [facility]   9/20/2017 7:30 AM Nelta Factor, PT [facility]   9/25/2017 7:30 AM Nelta Factor, PT [facility]   9/27/2017 7:30 AM Nelta Factor, PT Kelin Rawls

## 2017-09-18 ENCOUNTER — HOSPITAL ENCOUNTER (OUTPATIENT)
Dept: PHYSICAL THERAPY | Age: 32
Discharge: HOME | End: 2017-09-18
Payer: COMMERCIAL

## 2017-09-18 PROCEDURE — 97112 NEUROMUSCULAR REEDUCATION: CPT

## 2017-09-18 PROCEDURE — 97140 MANUAL THERAPY 1/> REGIONS: CPT

## 2017-09-18 NOTE — PROGRESS NOTES
PT DAILY TREATMENT NOTE     Patient Name: Tyrese Mcpherson  Date:2017  : 1985  [x]  Patient  Verified  Payor: BLUE CROSS / Plan: Argelia Quiros [id number] PPO / Product Type: PPO /    In time:735  Out time:800  Total Treatment Time (min): 25  Visit #: 3 of 8    Treatment Area: Neck pain [M54.2]  Neck sprain [S13. 9XXA]  Lumbar sprain [S33. 5XXA]    SUBJECTIVE  Pain Level (0-10 scale): 2  Any medication changes, allergies to medications, adverse drug reactions, diagnosis change, or new procedure performed?: [x] No    [] Yes (see summary sheet for update)  Subjective functional status/changes:   [] No changes reported  I'm doing okay. It's the Right side.      OBJECTIVE    Modality rationale:    Min Type Additional Details    [] Estim:  []Unatt       []IFC  []Premod                        []Other:  []w/ice   []w/heat  Position:  Location:    [] Estim: []Att    []TENS instruct  []NMES                    []Other:  []w/US   []w/ice   []w/heat  Position:  Location:    []  Traction: [] Cervical       []Lumbar                       [] Prone          []Supine                       []Intermittent   []Continuous Lbs:  [] before manual  [] after manual    []  Ultrasound: []Continuous   [] Pulsed                           []1MHz   []3MHz W/cm2:  Location:    []  Iontophoresis with dexamethasone         Location: [] Take home patch   [] In clinic    []  Ice     []  heat  []  Ice massage  []  Laser   []  Anodyne Position:  Location:    []  Laser with stim  []  Other:  Position:  Location:    []  Vasopneumatic Device Pressure:       [] lo [] med [] hi   Temperature: [] lo [] med [] hi   [] Skin assessment post-treatment:  []intact []redness- no adverse reaction    []redness  adverse reaction:     15 min Neuromuscular Re-education:  [x]  See flow sheet :   Rationale: increase strength and improve coordination  to improve the patients ability to improve postural awareness, cervical stability    10 min Manual Therapy:  STM Right cervical paraspinals/UT/scalenes   Rationale: decrease pain, increase ROM, increase tissue extensibility and decrease trigger points to improve ease of daily tasks     With   [] TE   [] TA   [] neuro   [] other: Patient Education: [x] Review HEP    [] Progressed/Changed HEP based on:   [] positioning   [] body mechanics   [] transfers   [] heat/ice application    [] other:      Other Objective/Functional Measures: completed exercises per flow sheet     Pain Level (0-10 scale) post treatment: 1-2    ASSESSMENT/Changes in Function: Slight decreased in Right cervical rotation AROM compared to Left after manual. Will assess alignment next visit. Patient will continue to benefit from skilled PT services to modify and progress therapeutic interventions, address functional mobility deficits, address ROM deficits, address strength deficits, analyze and address soft tissue restrictions, analyze and cue movement patterns, analyze and modify body mechanics/ergonomics, assess and modify postural abnormalities and instruct in home and community integration to attain remaining goals. []  See Plan of Care  []  See progress note/recertification  []  See Discharge Summary         Progress towards goals / Updated goals:  Goal: Patient will improve FOTO assessment score to 72 in order to indicate improved functional abilities. Status at last note/certification: 59  Current status: Not met, 58  Goal: Patient will improve cervical Right rotation and lateral flexion AROM by at least 10 degrees, without increase in pain, in order to improve ease of driving. Status at last note/certification: Rotation 55, lateral flexion 21  Current status: Progressing, limited change since last assessment  Goal: Patient will report worst neck pain as 4/10 or less in order to improve ease of caring for 3year old daughter.   Status at last note/certification: 0/37  Current status: Progressing, remains over 4/10 at worst    PLAN  [x]  Upgrade activities as tolerated     [x]  Continue plan of care  []  Update interventions per flow sheet       []  Discharge due to:_  []  Other:_      Baudilio Keys PT 9/18/2017  7:13 AM    Future Appointments  Date Time Provider Pako Moya   9/18/2017 7:30 AM Baudilio Keys, PT [facility]   9/20/2017 7:30 AM Baudilio Keys, PT [facility]   9/25/2017 7:30 AM [facility]   9/27/2017 7:30 AM Baudilio Keys, PT Amie Peck

## 2017-09-20 ENCOUNTER — HOSPITAL ENCOUNTER (OUTPATIENT)
Dept: PHYSICAL THERAPY | Age: 32
Discharge: HOME | End: 2017-09-20
Payer: COMMERCIAL

## 2017-09-20 PROCEDURE — 97112 NEUROMUSCULAR REEDUCATION: CPT

## 2017-09-20 PROCEDURE — 97140 MANUAL THERAPY 1/> REGIONS: CPT

## 2017-09-20 NOTE — PROGRESS NOTES
PT DAILY TREATMENT NOTE 12    Patient Name: Yury Linares  Date:2017  : 1985  [x]  Patient  Verified  Payor: BLUE CROSS / Plan: Argelia Quiros [id number] PPO / Product Type: PPO /    In time:730  Out time:804  Total Treatment Time (min): 34  Visit #: 4 of 8    Treatment Area: Neck pain [M54.2]  Neck sprain [S13. 9XXA]  Lumbar sprain [S33. 5XXA]    SUBJECTIVE  Pain Level (0-10 scale): 2-3  Any medication changes, allergies to medications, adverse drug reactions, diagnosis change, or new procedure performed?: [x] No    [] Yes (see summary sheet for update)  Subjective functional status/changes:   [] No changes reported  It's not as bad.      OBJECTIVE    Modality rationale:    Min Type Additional Details    [] Estim:  []Unatt       []IFC  []Premod                        []Other:  []w/ice   []w/heat  Position:  Location:    [] Estim: []Att    []TENS instruct  []NMES                    []Other:  []w/US   []w/ice   []w/heat  Position:  Location:    []  Traction: [] Cervical       []Lumbar                       [] Prone          []Supine                       []Intermittent   []Continuous Lbs:  [] before manual  [] after manual    []  Ultrasound: []Continuous   [] Pulsed                           []1MHz   []3MHz W/cm2:  Location:    []  Iontophoresis with dexamethasone         Location: [] Take home patch   [] In clinic    []  Ice     []  heat  []  Ice massage  []  Laser   []  Anodyne Position:  Location:    []  Laser with stim  []  Other:  Position:  Location:    []  Vasopneumatic Device Pressure:       [] lo [] med [] hi   Temperature: [] lo [] med [] hi   [] Skin assessment post-treatment:  []intact []redness- no adverse reaction    []redness  adverse reaction:     22 min Neuromuscular Re-education:  [x]  See flow sheet :   Rationale: increase strength and improve coordination  to improve the patients ability to improve cervical stability during job tasks    12 min Manual Therapy:  STM cervical paraspinals, UTs, scalenes, gentle cervical manual stretching, MET to correct rotation c/s   Rationale: decrease pain, increase ROM, increase tissue extensibility and decrease trigger points to progress toward premorbid status      With   [] TE   [] TA   [] neuro   [] other: Patient Education: [x] Review HEP    [] Progressed/Changed HEP based on:   [] positioning   [] body mechanics   [] transfers   [] heat/ice application    [] other:      Other Objective/Functional Measures: added band W, sidelying lateral cervical flexion     Pain Level (0-10 scale) post treatment: 2    ASSESSMENT/Changes in Function: Correction of Right rotated mid cervical vertebra; afterward Patient reports feeling looser and demonstrates improved ROM. Patient will continue to benefit from skilled PT services to modify and progress therapeutic interventions, address functional mobility deficits, address ROM deficits, address strength deficits, analyze and address soft tissue restrictions, analyze and cue movement patterns, analyze and modify body mechanics/ergonomics, assess and modify postural abnormalities and instruct in home and community integration to attain remaining goals. []  See Plan of Care  []  See progress note/recertification  []  See Discharge Summary         Progress towards goals / Updated goals:  Goal: Patient will improve FOTO assessment score to 72 in order to indicate improved functional abilities. Status at last note/certification: 59  Current status: Not met, 58, assess at 7th visit  Goal: Patient will improve cervical Right rotation and lateral flexion AROM by at least 10 degrees, without increase in pain, in order to improve ease of driving. Status at last note/certification: Rotation 55, lateral flexion 21  Current status: Progressing, Rotation 55 Lateral flexion 30  Goal: Patient will report worst neck pain as 4/10 or less in order to improve ease of caring for 3year old daughter.   Status at last note/certification: 9/69  Current status: Progressing, 5/10    PLAN  [x]  Upgrade activities as tolerated     [x]  Continue plan of care  []  Update interventions per flow sheet       []  Discharge due to:_  []  Other:_      Elissa Mansfield, PT 9/20/2017  7:10 AM    Future Appointments  Date Time Provider Pako Moya   9/20/2017 7:30 AM Elissa Mansfield, PT [facility]   9/25/2017 7:30 AM Elissa Mansfield, PT [facility]   9/27/2017 7:30 AM Elissa Mansfield, PT [facility]

## 2017-09-25 ENCOUNTER — HOSPITAL ENCOUNTER (OUTPATIENT)
Dept: PHYSICAL THERAPY | Age: 32
Discharge: HOME | End: 2017-09-25
Payer: COMMERCIAL

## 2017-09-25 PROCEDURE — 97112 NEUROMUSCULAR REEDUCATION: CPT

## 2017-09-25 PROCEDURE — 97140 MANUAL THERAPY 1/> REGIONS: CPT

## 2017-09-25 NOTE — PROGRESS NOTES
PT DAILY TREATMENT NOTE 12    Patient Name: Judit Currie  Date:2017  : 1985  [x]  Patient  Verified  Payor: BLUE CROSS / Plan: Argelia Quiros [id number] PPO / Product Type: PPO /    In time:730  Out time:756  Total Treatment Time (min): 26  Visit #: 5 of 8    Treatment Area: Neck pain [M54.2]  Neck sprain [S13. 9XXA]  Lumbar sprain [S33. 5XXA]    SUBJECTIVE  Pain Level (0-10 scale): 2  Any medication changes, allergies to medications, adverse drug reactions, diagnosis change, or new procedure performed?: [x] No    [] Yes (see summary sheet for update)  Subjective functional status/changes:   [] No changes reported  It's just the stiffness.      OBJECTIVE    Modality rationale:    Min Type Additional Details    [] Estim:  []Unatt       []IFC  []Premod                        []Other:  []w/ice   []w/heat  Position:  Location:    [] Estim: []Att    []TENS instruct  []NMES                    []Other:  []w/US   []w/ice   []w/heat  Position:  Location:    []  Traction: [] Cervical       []Lumbar                       [] Prone          []Supine                       []Intermittent   []Continuous Lbs:  [] before manual  [] after manual    []  Ultrasound: []Continuous   [] Pulsed                           []1MHz   []3MHz W/cm2:  Location:    []  Iontophoresis with dexamethasone         Location: [] Take home patch   [] In clinic    []  Ice     []  heat  []  Ice massage  []  Laser   []  Anodyne Position:  Location:    []  Laser with stim  []  Other:  Position:  Location:    []  Vasopneumatic Device Pressure:       [] lo [] med [] hi   Temperature: [] lo [] med [] hi   [] Skin assessment post-treatment:  []intact []redness- no adverse reaction    []redness  adverse reaction:     16 min Neuromuscular Re-education:  [x]  See flow sheet :   Rationale: increase strength and improve coordination  to improve the patients ability to improve ease of seated job tasks    10 min Manual Therapy:  STM cervical paraspinals/UT/scalenes, SOR, gentle cervical manual stretching/distraction   Rationale: decrease pain, increase ROM, increase tissue extensibility and decrease trigger points to improve ease of turning neck    With   [] TE   [] TA   [] neuro   [] other: Patient Education: [x] Review HEP    [] Progressed/Changed HEP based on:   [] positioning   [] body mechanics   [] transfers   [] heat/ice application    [] other:      Other Objective/Functional Measures: completed exercises per flow sheet     Pain Level (0-10 scale) post treatment: 2    ASSESSMENT/Changes in Function: Neutral cervical alignment this morning. MD follow up tomorrow. Will likely DC at next visit due to progressing well and upcoming new job/schedule starting next week. Patient will continue to benefit from skilled PT services to modify and progress therapeutic interventions, address functional mobility deficits, address ROM deficits, address strength deficits, analyze and address soft tissue restrictions, analyze and cue movement patterns, analyze and modify body mechanics/ergonomics, assess and modify postural abnormalities and instruct in home and community integration to attain remaining goals. []  See Plan of Care  []  See progress note/recertification  []  See Discharge Summary         Progress towards goals / Updated goals:  Goal: Patient will improve FOTO assessment score to 72 in order to indicate improved functional abilities. Status at last note/certification: 59  Current status: Not met, 58, assess at 7th visit  Goal: Patient will improve cervical Right rotation and lateral flexion AROM by at least 10 degrees, without increase in pain, in order to improve ease of driving. Status at last note/certification: Rotation 55, lateral flexion 21  Current status: Progressing, Rotation 55 Lateral flexion 30  Goal: Patient will report worst neck pain as 4/10 or less in order to improve ease of caring for 3year old daughter.   Status at last note/certification: 3/69  Current status: Progressing, 5/10    PLAN  [x]  Upgrade activities as tolerated     [x]  Continue plan of care  []  Update interventions per flow sheet       []  Discharge due to:_  []  Other:_      Caren Parker, PT 9/25/2017  7:06 AM    Future Appointments  Date Time Provider Pako Moya   9/25/2017 7:30 AM Caren Parker, JACKIE [facility]   9/27/2017 7:30 AM Caren Parker, PT [facility]

## 2017-09-27 ENCOUNTER — HOSPITAL ENCOUNTER (OUTPATIENT)
Dept: PHYSICAL THERAPY | Age: 32
End: 2017-09-27
Payer: COMMERCIAL
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
